# Patient Record
Sex: FEMALE | Race: WHITE | Employment: OTHER | URBAN - METROPOLITAN AREA
[De-identification: names, ages, dates, MRNs, and addresses within clinical notes are randomized per-mention and may not be internally consistent; named-entity substitution may affect disease eponyms.]

---

## 2018-05-11 ENCOUNTER — HOSPITAL ENCOUNTER (OUTPATIENT)
Dept: CT IMAGING | Age: 38
Discharge: HOME | End: 2018-05-11
Attending: OTOLARYNGOLOGY
Payer: COMMERCIAL

## 2018-05-11 DIAGNOSIS — R51.9 SINUS HEADACHE: ICD-10-CM

## 2018-05-11 DIAGNOSIS — J34.89 NASAL OBSTRUCTION: ICD-10-CM

## 2018-05-11 PROCEDURE — 70486 CT MAXILLOFACIAL W/O DYE: CPT

## 2024-12-05 ENCOUNTER — CLINICAL DOCUMENTATION (OUTPATIENT)
Dept: OBGYN CLINIC | Age: 44
End: 2024-12-05

## 2024-12-05 VITALS — BODY MASS INDEX: 33.91 KG/M2 | WEIGHT: 191.36 LBS | HEIGHT: 63 IN

## 2024-12-05 PROBLEM — O34.29 UTERINE SCAR FROM PREVIOUS SURGERY AFFECTING PREGNANCY: Status: ACTIVE | Noted: 2024-11-19

## 2024-12-05 PROBLEM — K92.1 MELENA: Status: RESOLVED | Noted: 2019-04-10 | Resolved: 2024-12-05

## 2024-12-05 PROBLEM — F30.9 MANIA (HCC): Status: ACTIVE | Noted: 2022-10-14

## 2024-12-05 PROBLEM — O09.529 MULTIGRAVIDA OF ADVANCED MATERNAL AGE: Status: ACTIVE | Noted: 2024-11-14

## 2024-12-05 PROBLEM — Z87.820 HISTORY OF TRAUMATIC BRAIN INJURY: Status: ACTIVE | Noted: 2022-10-14

## 2024-12-05 PROBLEM — O99.342 MENTAL DISORDER COMPLICATING PREGNANCY, SECOND TRIMESTER: Status: ACTIVE | Noted: 2024-12-05

## 2024-12-05 PROBLEM — O26.20 HIGH RISK PREGNANCY DUE TO RECURRENT MISCARRIAGE: Status: ACTIVE | Noted: 2024-11-14

## 2024-12-05 PROBLEM — F30.9 MANIA (HCC): Status: RESOLVED | Noted: 2022-10-14 | Resolved: 2024-12-05

## 2024-12-05 PROBLEM — O09.292 HISTORY OF CERVICAL CERCLAGE, CURRENTLY PREGNANT IN SECOND TRIMESTER: Status: ACTIVE | Noted: 2024-12-05

## 2024-12-05 PROBLEM — O09.219 HIGH RISK PREGNANCY DUE TO HISTORY OF PRETERM LABOR: Status: ACTIVE | Noted: 2024-11-14

## 2024-12-05 PROBLEM — F31.2 BIPOLAR DISORDER, CURRENT EPISODE MANIC SEVERE WITH PSYCHOTIC FEATURES (HCC): Status: ACTIVE | Noted: 2022-12-15

## 2024-12-05 PROBLEM — Z98.890 HISTORY OF CERVICAL CERCLAGE, CURRENTLY PREGNANT IN SECOND TRIMESTER: Status: ACTIVE | Noted: 2024-12-05

## 2024-12-05 PROBLEM — F29 PSYCHOTIC DISORDER (HCC): Status: ACTIVE | Noted: 2023-08-29

## 2024-12-05 PROBLEM — O09.30 LATE PRENATAL CARE: Status: ACTIVE | Noted: 2024-11-14

## 2024-12-05 PROBLEM — K92.1 MELENA: Status: ACTIVE | Noted: 2019-04-10

## 2024-12-05 PROBLEM — O09.522 HIGH RISK PREGNANCY, MULTIGRAVIDA OF ADVANCED MATERNAL AGE IN SECOND TRIMESTER: Status: ACTIVE | Noted: 2024-11-14

## 2024-12-05 RX ORDER — MAGNESIUM 200 MG
TABLET ORAL
COMMUNITY

## 2024-12-05 NOTE — PROGRESS NOTES
Chart reviewed and discussed with Dr Ospina.  Care of patient recommended to be transferred to [facility] for overall care. Analilia SRIVASTAVA called with recommendation.

## 2024-12-12 ENCOUNTER — ROUTINE PRENATAL (OUTPATIENT)
Dept: OBGYN CLINIC | Age: 44
End: 2024-12-12

## 2024-12-12 DIAGNOSIS — O09.522 HIGH RISK PREGNANCY, MULTIGRAVIDA OF ADVANCED MATERNAL AGE IN SECOND TRIMESTER: Primary | ICD-10-CM

## 2024-12-12 DIAGNOSIS — O09.292 HISTORY OF CERVICAL CERCLAGE, CURRENTLY PREGNANT IN SECOND TRIMESTER: ICD-10-CM

## 2024-12-12 DIAGNOSIS — Z98.890 HISTORY OF CERVICAL CERCLAGE, CURRENTLY PREGNANT IN SECOND TRIMESTER: ICD-10-CM

## 2024-12-12 DIAGNOSIS — O99.342 MENTAL DISORDER COMPLICATING PREGNANCY, SECOND TRIMESTER: ICD-10-CM

## 2024-12-12 DIAGNOSIS — Z87.820 HISTORY OF TRAUMATIC BRAIN INJURY: ICD-10-CM

## 2024-12-12 DIAGNOSIS — O09.30 LATE PRENATAL CARE: ICD-10-CM

## 2024-12-12 DIAGNOSIS — O34.29 UTERINE SCAR FROM PREVIOUS SURGERY AFFECTING PREGNANCY: ICD-10-CM

## 2024-12-24 ENCOUNTER — TELEPHONE (OUTPATIENT)
Dept: OBGYN CLINIC | Age: 44
End: 2024-12-24

## 2024-12-24 NOTE — TELEPHONE ENCOUNTER
Pt called yesterday afternoon at 4:58 stating her OB/Dr. Yadav told her to call our office concerning appointment.